# Patient Record
Sex: MALE | Race: OTHER
[De-identification: names, ages, dates, MRNs, and addresses within clinical notes are randomized per-mention and may not be internally consistent; named-entity substitution may affect disease eponyms.]

---

## 2017-06-12 ENCOUNTER — HOSPITAL ENCOUNTER (EMERGENCY)
Dept: HOSPITAL 61 - ER | Age: 77
Discharge: HOME | End: 2017-06-12
Payer: SELF-PAY

## 2017-06-12 VITALS — SYSTOLIC BLOOD PRESSURE: 144 MMHG | DIASTOLIC BLOOD PRESSURE: 93 MMHG

## 2017-06-12 VITALS — HEIGHT: 60 IN | WEIGHT: 140 LBS | BODY MASS INDEX: 27.48 KG/M2
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
## 2017-06-12 PROCEDURE — 99284 EMERGENCY DEPT VISIT MOD MDM: CPT

## 2017-06-12 PROCEDURE — 90471 IMMUNIZATION ADMIN: CPT

## 2017-06-12 PROCEDURE — 90715 TDAP VACCINE 7 YRS/> IM: CPT

## 2017-06-12 PROCEDURE — 73140 X-RAY EXAM OF FINGER(S): CPT

## 2017-06-12 PROCEDURE — 11730 AVULSION NAIL PLATE SIMPLE 1: CPT

## 2017-06-12 NOTE — OP
DATE OF SURGERY:  06/12/2017



SURGEON:  Kirk Valdez M.D.



ASSISTANT:  None.



ANESTHESIA:  Digital block.



ESTIMATED BLOOD LOSS:  10 mL.



PREOPERATIVE DIAGNOSES:

1.  Open phalangeal tuft fracture, left fourth digit.

2.  Nail bed injury with avulsion, left fourth digit.



PROCEDURE PERFORMED:

1.  Nail bed repair, left fourth digit.

2.  Irrigation and debridement down to bone.

3.  Laceration repair of 4 cm laceration.



COMPLICATIONS:  None.



REASON FOR PROCEDURE:  The patient is a very pleasant gentleman who injured his

left fourth fingertip and I was asked to see him in consultation after he

presented to the Emergency Department.  Please see my consult note for full

details.



DESCRIPTION OF PROCEDURE:  After a digital block was placed under sterile

technique with bupivacaine and I ensured good anesthesia, I began by irrigating

out the open fracture with sterile normal saline and syringe.  After appropriate

visualization was obtained, I then removed out the distal portion of his

fingernail, which had been transected from its proximal portion, that had been

avulsed from the nail fold.  I then used 5-0 chromic gut to reapproximate the

nail bed tissue and after this, I used 3-0 nylon in a simple interrupted fashion

to repair the laceration.  The fingertip had good capillary refill after this

was performed.  I then used Xeroform gauze and tufted into the nail fold and

covered the remainder of the nail bed with Xeroform gauze as well.  I then

applied more Xeroform gauze around the laceration.  I then placed a sterile

gauze and Kerlix around this digit and anchored into his hand and wrist.  He

tolerated this well.  Postop plan is nonweightbearing left upper extremity. 

Wound care instructions were discussed with his family who was present and acted

as an .  I gave them my contact information and told them to call my

clinic to schedule a followup appointment within 1 week.

 



______________________________

KIRK VALDEZ MD DR:  LILI/alysia  JOB#:  [id number] / [id number]

DD:  06/12/2017 16:45  DT:  06/12/2017 22:15

KEIRY

## 2017-06-12 NOTE — PHYS DOC
Past Medical History


Past Medical History:  No Pertinent History


Past Surgical History:  No Surgical History


Alcohol Use:  Occasionally


Drug Use:  None





Adult General


Chief Complaint


Chief Complaint:  FINGER INJURY





HPI


HPI





Patient is a 77  year old M who presents with left ring fingertip injury. 

Patient states he was working and a piece of wood fell on his left hand from 

approximately 8 feet injury and the fingertip to his left ring finger. Patient 

is a right-handed individual. Patient sustained no other injuries. Patient has 

no other complaints.





Pertinent exam findings:


Left ring fingertip amputation at the nailbed, cap refill the rest of the 

fingers less than 2 seconds, +2 radial pulse, patient can move all fingers 

without any difficulty





ED course:


Patient was seen and examined, the left ring finger was digitally blocked with 

bupivacaine 0.5% 8 mL


1524: Discussed CC/HP/PMH with Dr. Aragon and will come into the ER today to 

evaluate the patient, does not want to start antibiotics


1600:  Dr. Aragon at bedside to repair finger


1632: Dr. Aragon sutured the fingertip back on and we'll see if it sticks and 

will follow-up with him as an outpatient





Pertinent results:


X-ray left ring finger shows a open tuft fracture and a fingertip and dictation





MDM:


After reviewing the chart, CC/HPI/PMH, physical exam, [radiological results], 

do not believe the patient sustained a significant traumatic injury to the left 

hand warranting further workup and/or admission at this time. Orthopedics came 

and repaired the fingertip laceration at bedside. Patient is stable for 

discharge. Recommended follow-up with orthopedics within 1 week. Discussed 

wound care with the patient Additional verbal discharge instructions were 

provided to the patient and that if symptoms get worse or any new symptoms 

arise that are worrisome to the patient he is to return to the emergency room 

immediately





Review of Systems


Review of Systems


GEN: Denies fevers, chills, sweats


HEENT: Denies blurred vision, sore throat


CV: no chest pain


RESP: Denies shortness of air, cough


GI: Denies n/v/d


NEURO: Denies confusion, dizziness


MSK: Left finger tip pain on his ring finger





Current Medications


Current Medications





Current Medications








 Medications


  (Trade)  Dose


 Ordered  Sig/Yessi  Start Time


 Stop Time Status Last Admin


Dose Admin


 


 Bupivacaine HCl


  (Marcaine 0.5%)  50 ml  1X  ONCE  6/12/17 14:45


 6/12/17 14:46 DC  


 


 


 Diphtheria/


 Tetanus/Acell


 Pertussis


  (Boostrix)  0.5 ml  ONCE ONCE  6/12/17 14:45


 6/12/17 14:46 DC 6/12/17 15:09


0.5 ML











Allergies


Allergies





Allergies








Coded Allergies Type Severity Reaction Last Updated Verified


 


  No Known Drug Allergies    6/12/17 No











Physical Exam


Physical Exam


GEN.:    No apparent distress.  Alert and oriented.


HEENT:    Head is normocephalic, atraumatic


NECK:    Supple.  


LUNGS:    CTAB.


HEART:    RRR, S1, S2 present.  Peripheral pulses intact


ABDOMEN:    Soft, nontender.  Positive bowel sounds.


EXTREMITIES:    Without any cyanosis, Left ring fingertip amputation at the 

nailbed, cap refill the rest of the fingers less than 2 seconds, +2 radial pulse

, patient can move all fingers without any difficulty    


NEUROLOGIC:     Normal speech, normal tone


PSYCHIATRIC:    Normal affect, normal mood.


SKIN:   No ulcerations





Current Patient Data


Vital Signs





 Vital Signs








  Date Time  Temp Pulse Resp B/P (MAP) Pulse Ox O2 Delivery O2 Flow Rate FiO2


 


6/12/17 15:29  85 18 144/93 (110) 95 Room Air  


 


6/12/17 14:36 98.1       





 98.1       











EKG


EKG


[]





Radiology/Procedures


Radiology/Procedures


X-ray left ring finger shows open tuft fracture with a fingertip amputation []





Course & Med Decision Making


Course & Med Decision Making


Pertinent Labs and Imaging studies reviewed. (See chart for details)





[]





Dragon Disclaimer


Dragon Disclaimer


This electronic medical record was generated, in whole or in part, using a 

voice recognition dictation system.





Departure


Departure


Impression:  


 Primary Impression:  


 Nailbed laceration, finger


Disposition:  01 HOME, SELF-CARE


Condition:  IMPROVED


Referrals:  


NO PCP (PCP)








LUIS VALDEZ II, MD


Patient Instructions:  Fingertip Laceration





Additional Instructions:  


Follow-up with orthopedics within a week


Scripts


Hydrocodone/Apap 5-325 (NORCO 5-325 TABLET) 1 Each Tablet


1 TAB PO PRN Q4HRS Y for PAIN for 2 Days, #10 TAB 0 Refills


   Prov: BRICE TORRES DO         6/12/17 


Cephalexin (KEFLEX) 500 Mg Capsule


1 CAP PO TID, #21 CAP


   Prov: BRICE TORRES DO         6/12/17





Problem Qualifiers








 Primary Impression:  


 Nailbed laceration, finger


 Encounter type:  initial encounter  Qualified Codes:  S61.319A - Laceration 

without foreign body of unspecified finger with damage to nail, initial 

encounter








BRICE TORRES DO Jun 12, 2017 15:45

## 2017-06-13 NOTE — CONS
DATE OF CONSULTATION:  06/12/2017



REFERRING PROVIDER:  Dr. Uri Norris.



CONSULTING PROVIDER:  Kirk Hart MD



REASON FOR CONSULTATION:  Left fourth digit fingertip injury.



CHIEF COMPLAINT:  Left fourth digit pain.



HISTORY OF PRESENT ILLNESS:  The patient is a very pleasant 77-year-old 

speaking gentleman who apparently had a board fall on his finger suffering a

near complete fingertip amputation, which prompted his visit today.  He denies

anything else being injury.  He denies pain elsewhere.  He is here today with

family who were able to interpret.  He is right-hand dominant.



ALLERGIES:  None.



MEDICATIONS:  Reviewed, please see MRAD.



SOCIAL HISTORY:  No alcohol or tobacco.



FAMILY HISTORY:  Noncontributory.



REVIEW OF SYSTEMS:  Twelve point review of systems negative except as per HPI.



MEDICATIONS:  Tetanus was updated.



PHYSICAL EXAMINATION:

GENERAL:  The patient is alert and oriented.  No acute distress while at rest. 

Mood and affect are appropriate.  He is examined lying in hospital on the ER

stretcher.

HEENT:  Head normocephalic, atraumatic.  Extraocular muscles are intact.

CARDIOVASCULAR:  Regular rate and rhythm.  No edema in his lower extremities.

LUNGS:  Respirations are unlabored with symmetric chest rest.

ABDOMEN:  Soft, nondistended.

EXTREMITIES:  Examination of left upper extremity reveals sharp fingertip injury

through approximately 50% of his fingertip through the mid portion of the nail

of his left fourth digit.  Proximal portion of his nail had been avulsed. 
Sensation is intact to light touch along the radial

and ulnar border of all five digits.  He can flex and extend well his digits. 

FDS and FDP are intact digits 2 through 5.



IMAGING:  X-rays are interpreted by myself.  Report was also reviewed.  He has a

phalangeal tuft fracture through his nail bed injury.



IMPRESSION:

1.  Nail bed injury.

2.  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

3.  Phalangeal tuft fracture, open.



PLAN:  I did discuss proceeding with I and D and repair of the laceration as

well as the nail bed injury.  .  Family and the patient

agreed to proceed.  Please see my operative note for the procedure portion.

 



______________________________

KIRK HART MD



DR:  LILI/alysia  JOB#:  [id number] / [id number]

DD:  06/12/2017 16:03  DT:  06/13/2017 05:23

MTDD

## 2017-06-13 NOTE — RAD
EXAM: Left 4th finger 3 views.



HISTORY: Trauma.



COMPARISON: None.



FINDINGS: There is an open fracture of the 4th distal phalangeal tuft. There

is distraction and volar displacement of the distal fracture fragment. A large

laceration traverses the nailbed of the 4th digit. There is no radiopaque

foreign body.



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

It is also mild at the 4th and 5th distal interphalangeal joints and the 3rd

through 5th metacarpophalangeal joints.



IMPRESSION:

1. Open displaced fracture of the 4th distal phalangeal tuft with a nailbed

injury.

## 2018-05-23 ENCOUNTER — HOSPITAL ENCOUNTER (EMERGENCY)
Dept: HOSPITAL 61 - ER | Age: 78
Discharge: HOME | End: 2018-05-23
Payer: SELF-PAY

## 2018-05-23 DIAGNOSIS — M54.40: Primary | ICD-10-CM

## 2018-05-23 DIAGNOSIS — N40.0: ICD-10-CM

## 2018-05-23 DIAGNOSIS — M79.661: ICD-10-CM

## 2018-05-23 LAB
ADD MAN DIFF?: NO
ALBUMIN SERPL-MCNC: 3.6 G/DL (ref 3.4–5)
ALBUMIN/GLOB SERPL: 0.9 {RATIO} (ref 1–1.7)
ALP SERPL-CCNC: 62 U/L (ref 46–116)
ALT (SGPT): 48 U/L (ref 16–63)
ANION GAP SERPL CALC-SCNC: 9 MMOL/L (ref 6–14)
AST SERPL-CCNC: 32 U/L (ref 15–37)
BACTERIA #/AREA URNS HPF: 0 /HPF
BASO #: 0.1 X10^3/UL (ref 0–0.2)
BASO %: 1 % (ref 0–3)
BILIRUBIN,URINE: NEGATIVE
BLOOD UREA NITROGEN: 23 MG/DL (ref 8–26)
BUN/CREAT SERPL: 18 (ref 6–20)
CALCIUM: 9 MG/DL (ref 8.5–10.1)
CHLORIDE: 98 MMOL/L (ref 98–107)
CK SERPL-CCNC: 103 U/L (ref 39–308)
CK SERPL-CCNC: 113 U/L (ref 39–308)
CKMB INDEX: 1.5 % (ref 0–4)
CKMB MASS: 1.7 NG/ML (ref 0–3.6)
CLARITY,URINE: CLEAR
CO2 SERPL-SCNC: 22 MMOL/L (ref 21–32)
COLOR,URINE: YELLOW
CREAT SERPL-MCNC: 1.3 MG/DL (ref 0.7–1.3)
EOS #: 0.1 X10^3/UL (ref 0–0.7)
EOS %: 2 % (ref 0–3)
GFR SERPLBLD BASED ON 1.73 SQ M-ARVRAT: 53.1 ML/MIN
GLOBULIN SER-MCNC: 3.8 G/DL (ref 2.2–3.8)
GLUCOSE SERPL-MCNC: 115 MG/DL (ref 70–99)
GLUCOSE,URINE: NEGATIVE MG/DL
HCG SERPL-ACNC: 6.6 X10^3/UL (ref 4–11)
HEMATOCRIT: 44.8 % (ref 39–53)
HEMOGLOBIN: 15.5 G/DL (ref 13–17.5)
INR: 1 (ref 0.8–1.1)
LIPASE: 101 U/L (ref 73–393)
LYMPH #: 2.2 X10^3/UL (ref 1–4.8)
LYMPH %: 34 % (ref 24–48)
MEAN CORPUSCULAR HEMOGLOBIN: 32 PG (ref 25–35)
MEAN CORPUSCULAR HGB CONC: 35 G/DL (ref 31–37)
MEAN CORPUSCULAR VOLUME: 91 FL (ref 79–100)
MONO #: 0.5 X10^3/UL (ref 0–1.1)
MONO %: 8 % (ref 0–9)
NEUT #: 3.7 X10^3UL (ref 1.8–7.7)
NEUT %: 56 % (ref 31–73)
NITRITE UR QL STRIP: NEGATIVE
NT-PRO BNP: 130 PG/ML (ref 0–449)
PH,URINE: 6
PLATELET COUNT: 238 X10^3/UL (ref 140–400)
POTASSIUM SERPL-SCNC: 3.9 MMOL/L (ref 3.5–5.1)
PROTEIN,URINE: NEGATIVE MG/DL
PROTHROMBIN TIME PATIENT: 12.4 SEC (ref 11.7–14)
RBC,URINE: >40 /HPF (ref 0–2)
RED BLOOD COUNT: 4.92 X10^6/UL (ref 4.3–5.7)
RED CELL DISTRIBUTION WIDTH: 13.8 % (ref 11.5–14.5)
SODIUM: 129 MMOL/L (ref 136–145)
SPECIFIC GRAVITY,URINE: 1.01
SQUAMOUS EPITHELIAL CELL,UR: (no result) /LPF
TOTAL BILIRUBIN: 0.4 MG/DL (ref 0.2–1)
TOTAL PROTEIN: 7.4 G/DL (ref 6.4–8.2)
TROPONINI: < 0.017 NG/ML (ref 0–0.06)
UROBILINOGEN,URINE: 1 MG/DL
WBC #/AREA URNS HPF: (no result) /HPF (ref 0–4)

## 2018-05-23 PROCEDURE — 82553 CREATINE MB FRACTION: CPT

## 2018-05-23 PROCEDURE — 84484 ASSAY OF TROPONIN QUANT: CPT

## 2018-05-23 PROCEDURE — 96374 THER/PROPH/DIAG INJ IV PUSH: CPT

## 2018-05-23 PROCEDURE — 71045 X-RAY EXAM CHEST 1 VIEW: CPT

## 2018-05-23 PROCEDURE — 93005 ELECTROCARDIOGRAM TRACING: CPT

## 2018-05-23 PROCEDURE — 36415 COLL VENOUS BLD VENIPUNCTURE: CPT

## 2018-05-23 PROCEDURE — 80053 COMPREHEN METABOLIC PANEL: CPT

## 2018-05-23 PROCEDURE — 83880 ASSAY OF NATRIURETIC PEPTIDE: CPT

## 2018-05-23 PROCEDURE — 85025 COMPLETE CBC W/AUTO DIFF WBC: CPT

## 2018-05-23 PROCEDURE — 74177 CT ABD & PELVIS W/CONTRAST: CPT

## 2018-05-23 PROCEDURE — 82550 ASSAY OF CK (CPK): CPT

## 2018-05-23 PROCEDURE — 85610 PROTHROMBIN TIME: CPT

## 2018-05-23 PROCEDURE — 83690 ASSAY OF LIPASE: CPT

## 2018-05-23 PROCEDURE — 81001 URINALYSIS AUTO W/SCOPE: CPT

## 2018-05-23 PROCEDURE — 99285 EMERGENCY DEPT VISIT HI MDM: CPT

## 2018-05-23 PROCEDURE — 96375 TX/PRO/DX INJ NEW DRUG ADDON: CPT

## 2018-05-23 PROCEDURE — 93971 EXTREMITY STUDY: CPT

## 2018-05-23 RX ADMIN — ONDANSETRON 1 MG: 2 INJECTION INTRAMUSCULAR; INTRAVENOUS at 14:35

## 2018-05-23 RX ADMIN — IOHEXOL 1 ML: 300 INJECTION, SOLUTION INTRAVENOUS at 14:00

## 2018-05-23 RX ADMIN — BACITRACIN 1 MLS/HR: 5000 INJECTION, POWDER, FOR SOLUTION INTRAMUSCULAR at 15:31

## 2018-05-23 RX ADMIN — FENTANYL CITRATE 1 MCG: 50 INJECTION INTRAMUSCULAR; INTRAVENOUS at 14:36
